# Patient Record
Sex: FEMALE | Race: WHITE | Employment: OTHER | URBAN - METROPOLITAN AREA
[De-identification: names, ages, dates, MRNs, and addresses within clinical notes are randomized per-mention and may not be internally consistent; named-entity substitution may affect disease eponyms.]

---

## 2021-12-01 PROCEDURE — 85025 COMPLETE CBC W/AUTO DIFF WBC: CPT | Performed by: FAMILY MEDICINE

## 2021-12-01 NOTE — PROGRESS NOTES
Sonja Garcia is a 40year old female. CC:  Patient presents with:  Establish Care: per pt  Medication Request: ADHD medication      HPI:  New patient. Has been on Vyvnase for about 6 years for Binge Eating Disorder. It does help.  She has found the - 33.0 pg 23.8 Low        MCHC   32.0 - 36.0 g/dL 31.8 Low        RDW   11.0 - 15.0 % 17.1 High        PLATELET COUNT   983 - 400 Thousand/uL 324       MEAN PLATELET VOLUME   7.5 - 12.5 fL 8.5       NEUTROPHIL #   1,500 - 7,800 cells/uL 9914 High        LY normal.   NECK: No lymphadenopathy, thyromegaly or masses  CAR: S1, S2 normal, RRR; no S3, no S4; no click; murmur negative  PULM: clear to auscultation B, no accessory muscle use  GI: ---  PSYCH: alert and oriented x 3; affect appropriate  SKIN: not exami results to patient/family/caregiver: 0 minutes  Care coordination: 0 minutes     Authorized by Ganesh Hureta M.D.

## 2021-12-10 ENCOUNTER — PATIENT MESSAGE (OUTPATIENT)
Dept: FAMILY MEDICINE CLINIC | Facility: CLINIC | Age: 46
End: 2021-12-10

## 2021-12-10 NOTE — TELEPHONE ENCOUNTER
From: Kraig Carroll  To: Lolita Hernandez MD  Sent: 12/10/2021 2:11 PM CST  Subject: Vyvanse refill    Hi! I was told to leave a message when I need a refill.  I have never used my chart before and the refill option on here says I can’t request a refill thr

## 2022-01-14 ENCOUNTER — PATIENT MESSAGE (OUTPATIENT)
Dept: FAMILY MEDICINE CLINIC | Facility: CLINIC | Age: 47
End: 2022-01-14

## 2022-01-14 NOTE — TELEPHONE ENCOUNTER
From: Isamar Ridley  To: Horacio Lindsey MD  Sent: 1/14/2022 2:40 PM CST  Subject: My ’s appointment     Hi! I am not able to come to [facility] appointment today but I wanted to give you some info.  He has had some pretty significant short term memory

## 2022-02-08 ENCOUNTER — TELEPHONE (OUTPATIENT)
Dept: FAMILY MEDICINE CLINIC | Facility: CLINIC | Age: 47
End: 2022-02-08

## 2022-04-01 ENCOUNTER — OFFICE VISIT (OUTPATIENT)
Dept: FAMILY MEDICINE CLINIC | Facility: CLINIC | Age: 47
End: 2022-04-01

## 2022-04-01 VITALS
HEART RATE: 88 BPM | OXYGEN SATURATION: 99 % | WEIGHT: 213 LBS | TEMPERATURE: 98 F | DIASTOLIC BLOOD PRESSURE: 82 MMHG | BODY MASS INDEX: 30 KG/M2 | SYSTOLIC BLOOD PRESSURE: 122 MMHG

## 2022-04-01 DIAGNOSIS — D18.01 HEMANGIOMA OF SKIN: Primary | ICD-10-CM

## 2022-04-01 PROCEDURE — 99213 OFFICE O/P EST LOW 20 MIN: CPT | Performed by: FAMILY MEDICINE

## 2022-04-01 PROCEDURE — 3079F DIAST BP 80-89 MM HG: CPT | Performed by: FAMILY MEDICINE

## 2022-04-01 PROCEDURE — 3074F SYST BP LT 130 MM HG: CPT | Performed by: FAMILY MEDICINE

## 2022-04-14 ENCOUNTER — PATIENT MESSAGE (OUTPATIENT)
Dept: FAMILY MEDICINE CLINIC | Facility: CLINIC | Age: 47
End: 2022-04-14

## 2022-04-30 ENCOUNTER — TELEPHONE (OUTPATIENT)
Dept: FAMILY MEDICINE CLINIC | Facility: CLINIC | Age: 47
End: 2022-04-30

## 2022-04-30 NOTE — TELEPHONE ENCOUNTER
Please let patient or caregiver know or leave message that:   I received in the mail a form for medical assistance to help cover Trintellix. I have not prescribed this medication for Dian. If another doctor is prescribing this medication for her then a form needs to be sent to the other doctor.   Thanks

## 2022-05-02 NOTE — TELEPHONE ENCOUNTER
Spoke with patient. Medication on the form should have been for Vyvance.  She will request a new form be sent over

## 2022-05-13 ENCOUNTER — TELEPHONE (OUTPATIENT)
Dept: FAMILY MEDICINE CLINIC | Facility: CLINIC | Age: 47
End: 2022-05-13

## 2022-05-13 NOTE — TELEPHONE ENCOUNTER
Spoke with patient. She states that a form from [employer] was to be faxed over for her Vyvanse. Patient advised that a form has been received in the office but it has the wrong medication checked off. Patient states that she will contact them again on Monday to see if the new form has been mailed or can it be faxed? Fax number provided to patient.

## 2022-05-13 NOTE — TELEPHONE ENCOUNTER
REQUEST FOR VYVANSE. PATIENT STATES LAST REQUEST WAS FAXED BACK WITH WRONG MEDICATION. PATIENT STATES THE PHARMACY REFAXED THE REQUEST. NOT THE [facility], BUT ANOTHER PHARMACY (PT NOT SURE OF NAME) THAT HELPS WITH PRICE OF VYVANSE.

## 2022-05-17 ENCOUNTER — TELEPHONE (OUTPATIENT)
Dept: FAMILY MEDICINE CLINIC | Facility: CLINIC | Age: 47
End: 2022-05-17

## 2022-05-17 NOTE — TELEPHONE ENCOUNTER
Patient advised that the forms have been received in the office. Have been completed by Dr. Briseyda Amor. Patient asked that they be mailed and faxed to the company. Fax complete. Copy in mail.

## 2022-05-17 NOTE — TELEPHONE ENCOUNTER
Constantino Enciso from [employer] called.   She states they did not receive form for Vyvanse    Correct fax #  [phone]

## 2022-05-17 NOTE — TELEPHONE ENCOUNTER
Spoke with Kathya Winchester at [employer] Advised that the forms where completed and faxed to [phone]. Kathya Winchester advised that it does take 1- 2 days for the faxes to be delivered to the appropriate destination. They receive about 4,000 faxes per day. Advised to reach back out to the office if they need any more information.

## 2022-05-19 ENCOUNTER — PATIENT MESSAGE (OUTPATIENT)
Dept: FAMILY MEDICINE CLINIC | Facility: CLINIC | Age: 47
End: 2022-05-19

## 2022-05-19 NOTE — TELEPHONE ENCOUNTER
From: Doa Weaver  To: Susan Godinez MD  Sent: 5/19/2022 8:54 AM CDT  Subject: 2 questions     Good morning! The company I use to get my prescriptions through finally got all the paperwork and is processing everything but it takes at least 2 weeks. I only have 4 days left of my medication. Is it possible to get a partial prescription? Out of pocket Vyvanse is about $350-400. I had to pay that last year and I was hoping there might be a way to avoid that entire cost.   Secondly I can not find the name of the hematologist that you referred me to in my records. Is there one that I should schedule with? Thanks!

## 2022-05-24 ENCOUNTER — TELEPHONE (OUTPATIENT)
Dept: FAMILY MEDICINE CLINIC | Facility: CLINIC | Age: 47
End: 2022-05-24

## 2022-05-24 NOTE — TELEPHONE ENCOUNTER
Left message on patients voice and that the office has called the phone number provided and has provided the information that was not able to be verified. Update take 24-48 hours.

## 2022-05-24 NOTE — TELEPHONE ENCOUNTER
Pt called she got a call from prescription  and was advised that our address did not come through clearly on the fax. She was told the fastest way to handle that was to have our office call them with that information.  She said the number is [phone] and don't press any options just wait for a representative

## 2022-05-27 ENCOUNTER — PATIENT MESSAGE (OUTPATIENT)
Dept: FAMILY MEDICINE CLINIC | Facility: CLINIC | Age: 47
End: 2022-05-27

## 2022-05-27 NOTE — TELEPHONE ENCOUNTER
From: Ova Class  To: Lakia Barker MD  Sent: 5/27/2022 3:56 PM CDT  Subject: Mammogram     Hi there! If I schedule a mammogram through my health [employer] it is significantly less expensive. I can have a 2D screening in [address] [place] [address],[address] or a 3D screening in [facility]. Do you have a preference as to which type I should have? And is there anything I can do to be proactive to get the results to you? Thank you.    Dian

## 2022-07-16 ENCOUNTER — PATIENT MESSAGE (OUTPATIENT)
Dept: FAMILY MEDICINE CLINIC | Facility: CLINIC | Age: 47
End: 2022-07-16

## 2022-07-18 ENCOUNTER — PATIENT MESSAGE (OUTPATIENT)
Dept: FAMILY MEDICINE CLINIC | Facility: CLINIC | Age: 47
End: 2022-07-18

## 2022-07-18 NOTE — TELEPHONE ENCOUNTER
From: Cornell Lopez  To: Vipul Abrams MD  Sent: 7/16/2022 1:09 PM CDT  Subject: Blood pressure reading you requested     That bottom number is 71.

## 2022-07-25 NOTE — TELEPHONE ENCOUNTER
From: Ever Love  Sent: 7/24/2022 1:43 PM CDT  To: Kath Love Clinical Staff  Subject: Blood pressure reading you requested     My blood pressure reading was 132/71 this week.

## 2022-10-24 NOTE — TELEPHONE ENCOUNTER
No refill protocol for this medication. Last refill: 9/18/2022 #30 with 0 refills  Last Visit: 6/24/2022   Next Visit: No future appointments. Forward to Dr. Azael Strauss please advise on refills. Thanks.

## 2022-11-11 ENCOUNTER — MED REC SCAN ONLY (OUTPATIENT)
Dept: FAMILY MEDICINE CLINIC | Facility: CLINIC | Age: 47
End: 2022-11-11

## 2022-11-14 ENCOUNTER — PATIENT MESSAGE (OUTPATIENT)
Dept: FAMILY MEDICINE CLINIC | Facility: CLINIC | Age: 47
End: 2022-11-14

## 2022-12-08 ENCOUNTER — MED REC SCAN ONLY (OUTPATIENT)
Dept: FAMILY MEDICINE CLINIC | Facility: CLINIC | Age: 47
End: 2022-12-08

## 2023-01-20 ENCOUNTER — OFFICE VISIT (OUTPATIENT)
Dept: FAMILY MEDICINE CLINIC | Facility: CLINIC | Age: 48
End: 2023-01-20
Payer: COMMERCIAL

## 2023-01-20 VITALS
OXYGEN SATURATION: 99 % | HEIGHT: 71 IN | TEMPERATURE: 99 F | SYSTOLIC BLOOD PRESSURE: 124 MMHG | HEART RATE: 100 BPM | BODY MASS INDEX: 29.63 KG/M2 | DIASTOLIC BLOOD PRESSURE: 84 MMHG | RESPIRATION RATE: 16 BRPM | WEIGHT: 211.63 LBS

## 2023-01-20 DIAGNOSIS — M67.449 DIGITAL MUCINOUS CYST: ICD-10-CM

## 2023-01-20 DIAGNOSIS — F50.81 BINGE EATING DISORDER: ICD-10-CM

## 2023-01-20 DIAGNOSIS — Z00.00 WELL ADULT EXAM: Primary | ICD-10-CM

## 2023-01-20 PROCEDURE — 3008F BODY MASS INDEX DOCD: CPT | Performed by: FAMILY MEDICINE

## 2023-01-20 PROCEDURE — 3079F DIAST BP 80-89 MM HG: CPT | Performed by: FAMILY MEDICINE

## 2023-01-20 PROCEDURE — 3074F SYST BP LT 130 MM HG: CPT | Performed by: FAMILY MEDICINE

## 2023-01-20 PROCEDURE — 99396 PREV VISIT EST AGE 40-64: CPT | Performed by: FAMILY MEDICINE

## 2023-01-23 ENCOUNTER — NURSE ONLY (OUTPATIENT)
Dept: FAMILY MEDICINE CLINIC | Facility: CLINIC | Age: 48
End: 2023-01-23
Payer: COMMERCIAL

## 2023-01-23 DIAGNOSIS — Z00.00 WELL ADULT EXAM: ICD-10-CM

## 2023-01-23 LAB
ALT SERPL-CCNC: 21 U/L
ANION GAP SERPL CALC-SCNC: 2 MMOL/L (ref 0–18)
AST SERPL-CCNC: 15 U/L (ref 15–37)
BUN BLD-MCNC: 14 MG/DL (ref 7–18)
CALCIUM BLD-MCNC: 9 MG/DL (ref 8.5–10.1)
CHLORIDE SERPL-SCNC: 109 MMOL/L (ref 98–112)
CHOLEST SERPL-MCNC: 189 MG/DL (ref ?–200)
CO2 SERPL-SCNC: 25 MMOL/L (ref 21–32)
CREAT BLD-MCNC: 0.72 MG/DL
ERYTHROCYTE [DISTWIDTH] IN BLOOD BY AUTOMATED COUNT: 12.6 %
FASTING PATIENT LIPID ANSWER: YES
FASTING STATUS PATIENT QL REPORTED: YES
GFR SERPLBLD BASED ON 1.73 SQ M-ARVRAT: 104 ML/MIN/1.73M2 (ref 60–?)
GLUCOSE BLD-MCNC: 99 MG/DL (ref 70–99)
HCT VFR BLD AUTO: 41.7 %
HDLC SERPL-MCNC: 52 MG/DL (ref 40–59)
HGB BLD-MCNC: 13.5 G/DL
LDLC SERPL CALC-MCNC: 124 MG/DL (ref ?–100)
MCH RBC QN AUTO: 29.8 PG (ref 26–34)
MCHC RBC AUTO-ENTMCNC: 32.4 G/DL (ref 31–37)
MCV RBC AUTO: 92.1 FL
NONHDLC SERPL-MCNC: 137 MG/DL (ref ?–130)
OSMOLALITY SERPL CALC.SUM OF ELEC: 283 MOSM/KG (ref 275–295)
PLATELET # BLD AUTO: 263 10(3)UL (ref 150–450)
POTASSIUM SERPL-SCNC: 4.1 MMOL/L (ref 3.5–5.1)
RBC # BLD AUTO: 4.53 X10(6)UL
SODIUM SERPL-SCNC: 136 MMOL/L (ref 136–145)
TRIGL SERPL-MCNC: 68 MG/DL (ref 30–149)
TSI SER-ACNC: 1.46 MIU/ML (ref 0.36–3.74)
VLDLC SERPL CALC-MCNC: 12 MG/DL (ref 0–30)
WBC # BLD AUTO: 9.4 X10(3) UL (ref 4–11)

## 2023-01-23 PROCEDURE — 84460 ALANINE AMINO (ALT) (SGPT): CPT | Performed by: FAMILY MEDICINE

## 2023-01-23 PROCEDURE — 84450 TRANSFERASE (AST) (SGOT): CPT | Performed by: FAMILY MEDICINE

## 2023-01-23 PROCEDURE — 80048 BASIC METABOLIC PNL TOTAL CA: CPT | Performed by: FAMILY MEDICINE

## 2023-01-23 PROCEDURE — 84443 ASSAY THYROID STIM HORMONE: CPT | Performed by: FAMILY MEDICINE

## 2023-01-23 PROCEDURE — 85027 COMPLETE CBC AUTOMATED: CPT | Performed by: FAMILY MEDICINE

## 2023-01-23 PROCEDURE — 80061 LIPID PANEL: CPT | Performed by: FAMILY MEDICINE

## 2023-01-23 NOTE — PROGRESS NOTES
Babita Ivan present in office for nurse visit. Labs as ordered by Dr. Ely Ceja; 22 g, Right AC, lavender tube and green tube     All questions/concerns addressed. Patient left in stable condition.

## 2023-02-01 ENCOUNTER — MED REC SCAN ONLY (OUTPATIENT)
Dept: FAMILY MEDICINE CLINIC | Facility: CLINIC | Age: 48
End: 2023-02-01

## 2023-02-13 ENCOUNTER — OFFICE VISIT (OUTPATIENT)
Dept: ORTHOPEDICS CLINIC | Facility: CLINIC | Age: 48
End: 2023-02-13
Payer: COMMERCIAL

## 2023-02-13 ENCOUNTER — HOSPITAL ENCOUNTER (OUTPATIENT)
Dept: GENERAL RADIOLOGY | Age: 48
Discharge: HOME | End: 2023-02-13
Attending: PHYSICIAN ASSISTANT
Payer: COMMERCIAL

## 2023-02-13 VITALS — HEIGHT: 71 IN | WEIGHT: 211 LBS | BODY MASS INDEX: 29.54 KG/M2

## 2023-02-13 DIAGNOSIS — M79.645 FINGER PAIN, LEFT: ICD-10-CM

## 2023-02-13 DIAGNOSIS — M67.449 MUCOUS CYST OF FINGER: Primary | ICD-10-CM

## 2023-02-13 PROCEDURE — 73140 X-RAY EXAM OF FINGER(S): CPT | Performed by: PHYSICIAN ASSISTANT

## 2023-02-13 PROCEDURE — 3008F BODY MASS INDEX DOCD: CPT | Performed by: PHYSICIAN ASSISTANT

## 2023-02-13 PROCEDURE — 99203 OFFICE O/P NEW LOW 30 MIN: CPT | Performed by: PHYSICIAN ASSISTANT

## 2023-02-16 ENCOUNTER — TELEPHONE (OUTPATIENT)
Dept: ORTHOPEDICS CLINIC | Facility: CLINIC | Age: 48
End: 2023-02-16

## 2023-02-23 ENCOUNTER — TELEPHONE (OUTPATIENT)
Dept: ORTHOPEDICS CLINIC | Facility: CLINIC | Age: 48
End: 2023-02-23

## 2023-02-23 ENCOUNTER — PATIENT MESSAGE (OUTPATIENT)
Dept: FAMILY MEDICINE CLINIC | Facility: CLINIC | Age: 48
End: 2023-02-23

## 2023-02-23 DIAGNOSIS — M67.449 MUCOUS CYST OF FINGER: Primary | ICD-10-CM

## 2023-04-10 ENCOUNTER — OFFICE VISIT (OUTPATIENT)
Dept: ORTHOPEDICS CLINIC | Facility: CLINIC | Age: 48
End: 2023-04-10
Payer: COMMERCIAL

## 2023-04-10 VITALS — BODY MASS INDEX: 28.7 KG/M2 | WEIGHT: 205 LBS | HEIGHT: 71 IN

## 2023-04-10 DIAGNOSIS — M67.449 MUCOUS CYST OF FINGER: Primary | ICD-10-CM

## 2023-04-20 ENCOUNTER — MED REC SCAN ONLY (OUTPATIENT)
Dept: FAMILY MEDICINE CLINIC | Facility: CLINIC | Age: 48
End: 2023-04-20

## 2023-04-21 ENCOUNTER — APPOINTMENT (OUTPATIENT)
Dept: GENERAL RADIOLOGY | Facility: HOSPITAL | Age: 48
End: 2023-04-21
Attending: ORTHOPAEDIC SURGERY
Payer: COMMERCIAL

## 2023-04-21 ENCOUNTER — HOSPITAL ENCOUNTER (OUTPATIENT)
Facility: HOSPITAL | Age: 48
Setting detail: HOSPITAL OUTPATIENT SURGERY
Discharge: HOME | End: 2023-04-21
Attending: ORTHOPAEDIC SURGERY | Admitting: ORTHOPAEDIC SURGERY
Payer: COMMERCIAL

## 2023-04-21 VITALS
RESPIRATION RATE: 17 BRPM | TEMPERATURE: 97 F | SYSTOLIC BLOOD PRESSURE: 157 MMHG | HEART RATE: 76 BPM | WEIGHT: 214 LBS | OXYGEN SATURATION: 97 % | HEIGHT: 71 IN | BODY MASS INDEX: 29.96 KG/M2 | DIASTOLIC BLOOD PRESSURE: 91 MMHG

## 2023-04-21 DIAGNOSIS — M67.449 MUCOUS CYST OF FINGER: ICD-10-CM

## 2023-04-21 LAB — B-HCG UR QL: NEGATIVE

## 2023-04-21 PROCEDURE — 76000 FLUOROSCOPY <1 HR PHYS/QHP: CPT | Performed by: ORTHOPAEDIC SURGERY

## 2023-04-21 PROCEDURE — 0LB80ZZ EXCISION OF LEFT HAND TENDON, OPEN APPROACH: ICD-10-PCS | Performed by: ORTHOPAEDIC SURGERY

## 2023-04-21 PROCEDURE — 81025 URINE PREGNANCY TEST: CPT

## 2023-04-21 PROCEDURE — 88304 TISSUE EXAM BY PATHOLOGIST: CPT | Performed by: ORTHOPAEDIC SURGERY

## 2023-04-21 RX ORDER — HYDROCODONE BITARTRATE AND ACETAMINOPHEN 5; 325 MG/1; MG/1
1 TABLET ORAL EVERY 6 HOURS PRN
Qty: 5 TABLET | Refills: 0 | Status: SHIPPED | OUTPATIENT
Start: 2023-04-21

## 2023-04-21 RX ORDER — SODIUM CHLORIDE, SODIUM LACTATE, POTASSIUM CHLORIDE, CALCIUM CHLORIDE 600; 310; 30; 20 MG/100ML; MG/100ML; MG/100ML; MG/100ML
INJECTION, SOLUTION INTRAVENOUS CONTINUOUS
Status: DISCONTINUED | OUTPATIENT
Start: 2023-04-21 | End: 2023-04-21

## 2023-04-21 RX ORDER — CEFAZOLIN SODIUM/WATER 2 G/20 ML
SYRINGE (ML) INTRAVENOUS
Status: DISCONTINUED
Start: 2023-04-21 | End: 2023-04-21

## 2023-04-21 RX ORDER — BUPIVACAINE HYDROCHLORIDE 5 MG/ML
INJECTION, SOLUTION EPIDURAL; INTRACAUDAL AS NEEDED
Status: DISCONTINUED | OUTPATIENT
Start: 2023-04-21 | End: 2023-04-21 | Stop reason: HOSPADM

## 2023-04-21 RX ORDER — LIDOCAINE HYDROCHLORIDE 10 MG/ML
INJECTION, SOLUTION INFILTRATION; PERINEURAL AS NEEDED
Status: DISCONTINUED | OUTPATIENT
Start: 2023-04-21 | End: 2023-04-21 | Stop reason: HOSPADM

## 2023-04-21 RX ORDER — CEFAZOLIN SODIUM/WATER 2 G/20 ML
2 SYRINGE (ML) INTRAVENOUS ONCE
Status: COMPLETED | OUTPATIENT
Start: 2023-04-21 | End: 2023-04-21

## 2023-04-21 NOTE — OPERATIVE REPORT
Operative Note    Patient Name: Hung Lyn    Preoperative Diagnosis:     1. Mucous cyst of left middle finger [M67.449]  2. Bone spur of left middle finger DIP joint    Postoperative Diagnosis:     1. Mucous cyst of left middle finger [M67.449]  2. Bone spur left middle finger DIP joint    Surgeon(s) and Role:     Isabell Henderson MD - Primary     Assistant: PA: J CARLOS Burton     A PA was needed for the successful completion of this case. She was essential for the proper positioning of patient, manipulation of instruments, proper exposure, manipulation of soft tissue, and wound closure. Procedures:     1. Excision of left middle finger mucous cyst  2. Excision of left middle finger DIP joint bone spur    Antibiotics: Ancef 2 g    Surgical Findings: Mucous cyst with underlying bone spur    Anesthesia: Local    Complications: None    Implants: * No implants in log *    Specimen: Mucous cyst    Condition: Stable    Estimated Blood Loss: * No blood loss amount entered *     Indications:  56year old female with a symptomatic mucous cyst that was affecting the patient's job and was noted to be intermittently draining mucinous fluid. Having failed nonsurgical management patient wished to have this mucous cyst and bone spur surgically excised. The risks associated with the procedure, expected  outcome, the expected time to recovery, and the rehab required were reviewed. All of the patient's questions were answered. Procedure:  Patient was met in the preoperative holding area where consent was verified, laterality was marked with the surgeon's initials, and the H&P was updated. Patient was brought to the operating room on a transport cart. Patient was transferred from the transport cart onto the operating room table and placed in a supine position. An upper arm tourniquet was placed. The arm was prepped and draped in the usual sterile fashion. A digital block was performed.   A surgical timeout was performed. Antibiotics were fully infused. A finger tourniquet was applied. 15 blade was used to make a longitudinal incision adjacent to the mucous cyst incision was carried through dermis. The skin was elevated off the mucous cyst.  The mucous cyst was excised in its entirety off of the capsule. Capsulotomy was then performed and the bone spur over the distal aspect of the middle phalanx head was noted. A rongeur was used to excise the bone spur with smooth base. Fluoroscopy was used to confirm excision of bone spur. The wound was irrigated with sterile saline prior to closure    Closure:  4-0 nylon was used to reapproximate the skin edges. Dressing/Splint:  Bacitracin, Adaptic, gauze and 1 inch Coban. Post Operative:  Patient was taken to PACU for further recovery and discharge home. Jiaro Smalls MD  Hand, Wrist, & Elbow Surgery  [facility]  [facility] [address], [address]  [email or web address]  t: [id number]  f: [phone]

## 2023-04-21 NOTE — DISCHARGE INSTRUCTIONS
Dressing: Keep the dressing on until postop day 5. Keep dressing covered and dry while showering. No baths or submerging incision in water. Keep incision covered with band aid until follow up. Weight Bearing: No lifting greater than 1 pounds. Activity: Resume your normal activities of daily living within the 1 pound lifting restriction. Pain: Ice and elevate extremity to minimize swelling. Take acetaminophen and ibuprofen for pain. Follow-up: Call for follow-up appointment if you do not have one.

## 2023-05-01 ENCOUNTER — MED REC SCAN ONLY (OUTPATIENT)
Dept: FAMILY MEDICINE CLINIC | Facility: CLINIC | Age: 48
End: 2023-05-01

## 2023-05-08 ENCOUNTER — OFFICE VISIT (OUTPATIENT)
Dept: ORTHOPEDICS CLINIC | Facility: CLINIC | Age: 48
End: 2023-05-08
Payer: COMMERCIAL

## 2023-05-08 VITALS — HEIGHT: 71 IN | WEIGHT: 214 LBS | BODY MASS INDEX: 29.96 KG/M2

## 2023-05-08 DIAGNOSIS — M67.449 MUCOUS CYST OF FINGER: Primary | ICD-10-CM

## 2023-05-08 PROCEDURE — 99024 POSTOP FOLLOW-UP VISIT: CPT | Performed by: PHYSICIAN ASSISTANT

## 2023-05-08 PROCEDURE — 3008F BODY MASS INDEX DOCD: CPT | Performed by: PHYSICIAN ASSISTANT

## 2023-06-23 ENCOUNTER — PATIENT MESSAGE (OUTPATIENT)
Dept: ORTHOPEDICS CLINIC | Facility: CLINIC | Age: 48
End: 2023-06-23

## 2023-06-27 ENCOUNTER — PATIENT MESSAGE (OUTPATIENT)
Dept: FAMILY MEDICINE CLINIC | Facility: CLINIC | Age: 48
End: 2023-06-27

## 2023-07-10 ENCOUNTER — OFFICE VISIT (OUTPATIENT)
Dept: ORTHOPEDICS CLINIC | Facility: CLINIC | Age: 48
End: 2023-07-10
Payer: COMMERCIAL

## 2023-07-10 ENCOUNTER — HOSPITAL ENCOUNTER (OUTPATIENT)
Dept: GENERAL RADIOLOGY | Age: 48
Discharge: HOME | End: 2023-07-10
Attending: ORTHOPAEDIC SURGERY
Payer: COMMERCIAL

## 2023-07-10 VITALS — HEIGHT: 71 IN | WEIGHT: 214 LBS | BODY MASS INDEX: 29.96 KG/M2

## 2023-07-10 DIAGNOSIS — M67.449 MUCOUS CYST OF FINGER: ICD-10-CM

## 2023-07-10 DIAGNOSIS — M67.449 MUCOUS CYST OF FINGER: Primary | ICD-10-CM

## 2023-07-10 PROCEDURE — 3008F BODY MASS INDEX DOCD: CPT | Performed by: ORTHOPAEDIC SURGERY

## 2023-07-10 PROCEDURE — 73140 X-RAY EXAM OF FINGER(S): CPT | Performed by: ORTHOPAEDIC SURGERY

## 2023-07-10 PROCEDURE — 99214 OFFICE O/P EST MOD 30 MIN: CPT | Performed by: ORTHOPAEDIC SURGERY

## 2023-07-18 ENCOUNTER — TELEPHONE (OUTPATIENT)
Dept: ORTHOPEDICS CLINIC | Facility: CLINIC | Age: 48
End: 2023-07-18

## 2023-07-18 DIAGNOSIS — M67.449 MUCOUS CYST OF FINGER: Primary | ICD-10-CM

## 2023-07-18 NOTE — TELEPHONE ENCOUNTER
Date of Surgery: 10/27/23     Post Op Appt:  23  @ 820AM     Case ID: [id number]     Notes:           SURGICAL BOOKING SHEET   Name: Carissa Andrade  MRN: [id number]   : 1975     Surgical Date:    10/27/23   Surgical Consent:    Excision of left middle finger mucous cyst and bone spur   Diagnosis:     (M67.449) Mucous cyst of finger  (primary encounter diagnosis)    Procedure Codes:    Excision of DIP joint osteophyte (37153) or tendon sheath cyst (68580)   Disposition:    Outpatient   Operative Time:    15 mins   Antibiotics:    Ancef 2g   Anesthesia Type:    Local, PIV Insertion, LR 20mL/hr   Clearance:     NONE   Equipment:    Mucous Cyst: [place] Blade, Mini-C-arm, Local Off Field (0.5% marcaine + 1% lidocaine w/o Epi 1:1 mix), Size 6 Glove, 4-0 nylon, Bacitracin, Adaptic, 1 inch beau, 1 inch Coban   Positioning:    Supine with arm table on transport cart   Assistant:    Assistant: Roxy Guardado PA-C   Follow Up:    12-14 days post op with Vinita Berg   Pain Medication:    Norco   Therapy:    None

## 2023-08-28 ENCOUNTER — PATIENT MESSAGE (OUTPATIENT)
Dept: FAMILY MEDICINE CLINIC | Facility: CLINIC | Age: 48
End: 2023-08-28

## 2023-09-07 ENCOUNTER — MED REC SCAN ONLY (OUTPATIENT)
Dept: FAMILY MEDICINE CLINIC | Facility: CLINIC | Age: 48
End: 2023-09-07

## 2023-10-05 ENCOUNTER — PATIENT MESSAGE (OUTPATIENT)
Dept: FAMILY MEDICINE CLINIC | Facility: CLINIC | Age: 48
End: 2023-10-05

## 2023-10-06 RX ORDER — LISDEXAMFETAMINE DIMESYLATE CAPSULES 60 MG/1
60 CAPSULE ORAL EVERY MORNING
Qty: 30 CAPSULE | Refills: 0 | Status: SHIPPED | OUTPATIENT
Start: 2023-10-06

## 2023-10-06 NOTE — TELEPHONE ENCOUNTER
From: Kassandra Fink  To: See Houser  Sent: 10/5/2023 9:57 PM CDT  Subject: Vyvanse refill    Hi! Vyvanse is now available in generic form so it should be easier to find. Jose M says I need a new prescription. Can we stick with the 60mg. I feel like I tolerate that better than the 70mg. I feel less anxious. Thank you!    Kenneth

## 2023-10-13 ENCOUNTER — TELEPHONE (OUTPATIENT)
Dept: ORTHOPEDICS CLINIC | Facility: CLINIC | Age: 48
End: 2023-10-13

## 2023-10-13 NOTE — TELEPHONE ENCOUNTER
Called and spoke with Kenneth in regards to her upcoming surgery. She states that she would like to cancel her upcoming surgery as her cyst has grown back but is not bothersome. She states that she is going through some life changes and at this time she is not financially, or emotionally ready to proceed with surgery. She states that she will give the office a call back when she is ready to proceed with surgery.        SURGERY HAS BEEN CANCELED

## 2023-11-08 RX ORDER — LISDEXAMFETAMINE DIMESYLATE CAPSULES 60 MG/1
60 CAPSULE ORAL EVERY MORNING
Qty: 30 CAPSULE | Refills: 0 | Status: SHIPPED | OUTPATIENT
Start: 2023-11-08

## 2023-12-08 RX ORDER — LISDEXAMFETAMINE DIMESYLATE CAPSULES 60 MG/1
60 CAPSULE ORAL EVERY MORNING
Qty: 30 CAPSULE | Refills: 0 | Status: SHIPPED | OUTPATIENT
Start: 2023-12-08

## 2024-01-12 NOTE — TELEPHONE ENCOUNTER
Please let patient or caregiver know or leave message that refill request for the medication/s listed below has/have come to our office. The refills have been sent.  Her insurance is recommending/requiring colon cancer screening. If not done then our office will be penalized. There is a push to have these done by health entities now. We can do FIT, Cologuard or Colonoscopy. If she prefers Colonoscopy then see [facility] Tel: [phone]. It would be appreciated if something could be done in the next 6 months so our office avoid penalty from her insurance.   Thanks       Requested Prescriptions     Signed Prescriptions Disp Refills    Lisdexamfetamine Dimesylate (VYVANSE) 60 MG Oral Cap 30 capsule 0     Sig: Take 1 capsule (60 mg total) by mouth every morning.     Authorizing Provider: LIZETTE FOWLER

## 2024-01-16 ENCOUNTER — TELEPHONE (OUTPATIENT)
Dept: FAMILY MEDICINE CLINIC | Facility: CLINIC | Age: 49
End: 2024-01-16

## 2024-01-17 ENCOUNTER — PATIENT MESSAGE (OUTPATIENT)
Dept: FAMILY MEDICINE CLINIC | Facility: CLINIC | Age: 49
End: 2024-01-17

## 2024-01-17 NOTE — TELEPHONE ENCOUNTER
From: Babita Joy  To: Prince Schaffer  Sent: 1/17/2024 3:14 PM CST  Subject: Insurance issue with prescription     Jose M says there is an insurance issue and they faxed you a request for it. I was just following up because they still are unable to fill it and I don’t have a ton of confidence in what they tell me.   Thanks!   Have a great day!  Kenneth

## 2024-01-19 NOTE — TELEPHONE ENCOUNTER
Outcome  Approved on January 17  The case has been Approved from [id number] to [id number]  Authorization Expiration Date: 1/16/2025

## 2024-01-24 ENCOUNTER — TELEPHONE (OUTPATIENT)
Dept: FAMILY MEDICINE CLINIC | Facility: CLINIC | Age: 49
End: 2024-01-24

## 2024-01-24 ENCOUNTER — HOSPITAL ENCOUNTER (OUTPATIENT)
Dept: MAMMOGRAPHY | Age: 49
Discharge: HOME | End: 2024-01-24
Attending: FAMILY MEDICINE
Payer: COMMERCIAL

## 2024-01-24 DIAGNOSIS — Z12.31 BREAST CANCER SCREENING BY MAMMOGRAM: ICD-10-CM

## 2024-01-24 PROCEDURE — 77063 BREAST TOMOSYNTHESIS BI: CPT | Performed by: FAMILY MEDICINE

## 2024-01-24 PROCEDURE — 77067 SCR MAMMO BI INCL CAD: CPT | Performed by: FAMILY MEDICINE

## 2024-01-24 NOTE — TELEPHONE ENCOUNTER
Left detailed message on pt's voicemail to call back or send [employer] on if she would like to  FIT test or have us mail on to her.

## 2024-02-06 ENCOUNTER — PATIENT MESSAGE (OUTPATIENT)
Dept: FAMILY MEDICINE CLINIC | Facility: CLINIC | Age: 49
End: 2024-02-06

## 2024-02-06 DIAGNOSIS — R92.8 ABNORMAL MAMMOGRAM OF BOTH BREASTS: Primary | ICD-10-CM

## 2024-02-06 NOTE — TELEPHONE ENCOUNTER
From: Babita Joy  To: Prince Schaffer  Sent: 2/6/2024 3:35 PM CST  Subject: Mammogram     Hi! I was contacted by someone saying I needed additional imaging. Maybe an ultrasound. She said radiology would contact me but I should call if I had not heard from them. So I called to schedule but central scheduling said they didn’t see an order for any additional imaging. I was just following up to see what my next step should be.   Thanks!   Kenneth

## 2024-02-15 ENCOUNTER — PATIENT MESSAGE (OUTPATIENT)
Dept: FAMILY MEDICINE CLINIC | Facility: CLINIC | Age: 49
End: 2024-02-15

## 2024-02-19 ENCOUNTER — NURSE ONLY (OUTPATIENT)
Dept: FAMILY MEDICINE CLINIC | Facility: CLINIC | Age: 49
End: 2024-02-19
Payer: COMMERCIAL

## 2024-02-19 DIAGNOSIS — Z12.11 COLON CANCER SCREENING: ICD-10-CM

## 2024-02-19 LAB — HEMOCCULT STL QL: NEGATIVE

## 2024-02-19 PROCEDURE — 82274 ASSAY TEST FOR BLOOD FECAL: CPT | Performed by: FAMILY MEDICINE

## 2024-02-20 DIAGNOSIS — F50.81 BINGE EATING DISORDER: ICD-10-CM

## 2024-02-20 RX ORDER — LISDEXAMFETAMINE DIMESYLATE CAPSULES 60 MG/1
60 CAPSULE ORAL EVERY MORNING
Qty: 30 CAPSULE | Refills: 0 | Status: SHIPPED | OUTPATIENT
Start: 2024-02-20

## 2024-02-20 NOTE — TELEPHONE ENCOUNTER
LOV: 12/23/23   Last Refill: 01/12/24     Future Appointments   Date Time Provider Department Center   2/26/2024  2:20 PM PF [facility]andes

## 2024-02-26 ENCOUNTER — HOSPITAL ENCOUNTER (OUTPATIENT)
Dept: MAMMOGRAPHY | Age: 49
Discharge: HOME | End: 2024-02-26
Attending: FAMILY MEDICINE
Payer: COMMERCIAL

## 2024-02-26 ENCOUNTER — HOSPITAL ENCOUNTER (OUTPATIENT)
Dept: ULTRASOUND IMAGING | Age: 49
Discharge: HOME | End: 2024-02-26
Attending: FAMILY MEDICINE
Payer: COMMERCIAL

## 2024-02-26 DIAGNOSIS — R92.8 ABNORMAL MAMMOGRAM OF BOTH BREASTS: ICD-10-CM

## 2024-02-26 PROCEDURE — 77066 DX MAMMO INCL CAD BI: CPT | Performed by: FAMILY MEDICINE

## 2024-02-26 PROCEDURE — 76642 ULTRASOUND BREAST LIMITED: CPT | Performed by: FAMILY MEDICINE

## 2024-02-26 PROCEDURE — 77062 BREAST TOMOSYNTHESIS BI: CPT | Performed by: FAMILY MEDICINE

## 2024-02-29 NOTE — TELEPHONE ENCOUNTER
----- Message from Shawanda Grimaldo RN sent at 2024 10:57 AM CST -----  Patient's name and  verified   Patient was wondering if the calcium deposits could be due to breast feeding.  Patient doesn't take any calcium supplement  Patient notified and verbalized an understanding

## 2024-03-19 DIAGNOSIS — F50.81 BINGE EATING DISORDER: ICD-10-CM

## 2024-03-20 RX ORDER — LISDEXAMFETAMINE DIMESYLATE CAPSULES 60 MG/1
60 CAPSULE ORAL EVERY MORNING
Qty: 30 CAPSULE | Refills: 0 | Status: SHIPPED | OUTPATIENT
Start: 2024-03-20

## 2024-04-18 DIAGNOSIS — F50.81 BINGE EATING DISORDER: ICD-10-CM

## 2024-04-19 RX ORDER — LISDEXAMFETAMINE DIMESYLATE CAPSULES 60 MG/1
60 CAPSULE ORAL EVERY MORNING
Qty: 30 CAPSULE | Refills: 0 | Status: SHIPPED | OUTPATIENT
Start: 2024-04-19

## 2024-04-23 ENCOUNTER — PATIENT MESSAGE (OUTPATIENT)
Dept: FAMILY MEDICINE CLINIC | Facility: CLINIC | Age: 49
End: 2024-04-23

## 2024-04-23 NOTE — TELEPHONE ENCOUNTER
From: Babita Joy  To: Prince Schaffer  Sent: 4/23/2024 12:47 PM CDT  Subject: Insurance issue with prescription     Jose M contacted me to tell me there is an insurance issue with my prescription. They don’t have nikki information and said they are waiting to hear back from my insurance. Do you know what is going on with it?   Thanks!   Kenneth

## 2024-05-28 DIAGNOSIS — F50.81 BINGE EATING DISORDER: ICD-10-CM

## 2024-05-28 RX ORDER — LISDEXAMFETAMINE DIMESYLATE 60 MG/1
60 CAPSULE ORAL EVERY MORNING
Qty: 30 CAPSULE | Refills: 0 | Status: SHIPPED | OUTPATIENT
Start: 2024-05-28

## 2024-06-14 ENCOUNTER — OFFICE VISIT (OUTPATIENT)
Dept: FAMILY MEDICINE CLINIC | Facility: CLINIC | Age: 49
End: 2024-06-14
Payer: COMMERCIAL

## 2024-06-14 VITALS
TEMPERATURE: 98 F | OXYGEN SATURATION: 99 % | SYSTOLIC BLOOD PRESSURE: 134 MMHG | WEIGHT: 209.81 LBS | HEART RATE: 84 BPM | HEIGHT: 71 IN | BODY MASS INDEX: 29.37 KG/M2 | DIASTOLIC BLOOD PRESSURE: 82 MMHG

## 2024-06-14 DIAGNOSIS — R51.9 ACUTE NONINTRACTABLE HEADACHE, UNSPECIFIED HEADACHE TYPE: ICD-10-CM

## 2024-06-14 DIAGNOSIS — Z00.00 WELL ADULT EXAM: Primary | ICD-10-CM

## 2024-06-14 DIAGNOSIS — F50.81 BINGE EATING DISORDER: ICD-10-CM

## 2024-06-14 PROBLEM — F50.819 BINGE EATING DISORDER: Status: ACTIVE | Noted: 2024-06-14

## 2024-06-14 PROCEDURE — 99396 PREV VISIT EST AGE 40-64: CPT | Performed by: FAMILY MEDICINE

## 2024-06-14 PROCEDURE — 3079F DIAST BP 80-89 MM HG: CPT | Performed by: FAMILY MEDICINE

## 2024-06-14 PROCEDURE — 3008F BODY MASS INDEX DOCD: CPT | Performed by: FAMILY MEDICINE

## 2024-06-14 PROCEDURE — 3075F SYST BP GE 130 - 139MM HG: CPT | Performed by: FAMILY MEDICINE

## 2024-06-14 NOTE — PROGRESS NOTES
Babita Joy is a 48 year old female.    CC:    Chief Complaint   Patient presents with    Physical       HPI:  Patient is here for yearly, wellness exam  Last Lipid:  Lab Results   Component Value Date    CHOLEST 189 2023    TRIG 68 2023    HDL 52 2023     (H) 2023    VLDL 12 2023    NONHDLC 137 (H) 2023       Last colon cancer screen:  FIT     Last mammo: 2024, will need f/u diagnostic mammo in 2024 per Radiology recommendation    Last Pap: 2022        There is no immunization history on file for this patient.       Patient Active Problem List   Diagnosis    Binge eating disorder: has been on Vyvance for many years. It works well to control symptoms.        F/u ER visit last month to [facility] for sever headache that awoke her. CT brain at ER unremarkable. No labs done. She did have emesis with the headache. She does not typically get headaches.   CT Brain without IV Contrast    Anatomical Region Laterality Modality   Head -- Computed Tomography     Impression    Impression:    1.  No obvious acute intracranial hemorrhage or depressed skull fracture. No large territory acute infarct.    Electronically Signed By: Sarah Banks on 2024 10:52 AM CDT Reading Location: [id number]    Allergies:  Allergies   Allergen Reactions    Penicillins RASH    Povidone Iodine RASH      Current Meds:  Current Outpatient Medications   Medication Sig Dispense Refill    Lisdexamfetamine Dimesylate (VYVANSE) 60 MG Oral Cap Take 1 capsule (60 mg total) by mouth every morning. 30 capsule 0        History:  Past Medical History:    Anemia    Due to heavy/irregular menses    Attention deficit hyperactivity disorder (ADHD)    Binge eating disorder    Hx of motion sickness    PONV (postoperative nausea and vomiting)      Past Surgical History:   Procedure Laterality Date    Anesth, section      x 2      Family History   Problem Relation Age of Onset     Hypertension Mother     Diabetes Father     Bipolar Disorder Father     Heart Disease Father     Breast Cancer Maternal Aunt 52      Family Status   Relation Status    Mo Alive    Fa     Mat Aunt (Not Specified)      Social History     Socioeconomic History    Marital status:    Tobacco Use    Smoking status: Never    Smokeless tobacco: Never   Vaping Use    Vaping status: Never Used   Substance and Sexual Activity    Alcohol use: Yes     Comment: social wine intake    Drug use: Yes     Types: Cannabis     Comment: occasionally     Social Determinants of Health     Food Insecurity: No Food Insecurity (2024)    Received from [facility]    Food Insecurity     Currently or in the past 3 months, have you worried your food would run out before you had money to buy more?: No   Transportation Needs: No Transportation Needs (2024)    Received from [facility]    Transportation Needs     Medical Transportation Needs?: No    Received from [facility], [facility]    Social Connections    Received from [facility], [facility]    Housing Stability        ROS:  General: energy level stable  Cardiovascular/Pulses: Denies palpitations, exertional chest pain or pressure    Vitals: /82   Pulse 84   Temp 97.6 °F (36.4 °C) (Temporal)   Ht 5' 11\" (1.803 m)   Wt 209 lb 12.8 oz (95.2 kg)   LMP 2024 (Exact Date)   SpO2 99%   BMI 29.26 kg/m²    Reviewed by RICK Schaffer M.D.    Physical Exam:  GEN: well developed, well nourished, in no apparent distress  EYE: B conjunctiva and lids normal  HENT: normocephalic; normal nose, pharynx and TM's  NECK: No lymphadenopathy, thyromegaly or masses  CAR: S1, S2 normal, RRR; no S3, no S4; no click; murmur negative  PULM: clear to auscultation B, no accessory muscle use  GI: normal active BS+, soft, nondistended; no HSM; no masses; no bruits; no  masses; nontender, no G/R/R   PSYCH: alert and oriented x 3; affect appropriate  SKIN: not examined  EXTREMITIES: No clubbing, cyanosis or edema  GENITAL: not examined  LYMPH: no supraclavicular nodes  NEURO: Awake and alert. Normal speech and articulation. No facial droop or asymmetry. Moving all extremities equally. Symmetric B patellar DTRs, CN 2-12 grossly intact. No focal deficits. Finger to nose and heel to shin intact. No pronator drift. No dysdiadochokinesis.      ASSESSMENT AND PLAN    1. Well adult exam  Td recommended. She will contemplate. If Td not done and she sustains a tetanus prone wound then she is to go immediately to the nearest ER    - ALT(SGPT); Future  - AST (SGOT); Future  - Basic Metabolic Panel (8); Future  - CBC, Platelet; No Differential; Future  - Lipid Panel; Future  - TSH W Reflex To Free T4; Future    2. Binge eating disorder  Stable   Continue Vyvnase    3. Acute nonintractable headache, unspecified headache type  To ensure no other evaluation is needed.  - Neuro Referral - In Network      No follow-ups on file.    Orders for this visit:    Orders Placed This Encounter   Procedures    ALT(SGPT)     Standing Status:   Future     Standing Expiration Date:   6/14/2025    AST (SGOT)     Standing Status:   Future     Standing Expiration Date:   6/14/2025    Basic Metabolic Panel (8)     Standing Status:   Future     Standing Expiration Date:   6/14/2025    CBC, Platelet; No Differential     Standing Status:   Future     Standing Expiration Date:   6/14/2025    Lipid Panel     Standing Status:   Future     Standing Expiration Date:   6/14/2025    TSH W Reflex To Free T4     Standing Status:   Future     Standing Expiration Date:   6/14/2025       NEURO - INTERNAL    Meds & Refills for this Visit:  Requested Prescriptions      No prescriptions requested or ordered in this encounter             Authorized by Prince Schaffer M.D.

## 2024-06-17 ENCOUNTER — LABORATORY ENCOUNTER (OUTPATIENT)
Dept: LAB | Age: 49
End: 2024-06-17
Attending: FAMILY MEDICINE

## 2024-06-17 DIAGNOSIS — Z00.00 WELL ADULT EXAM: ICD-10-CM

## 2024-06-17 LAB
ALT SERPL-CCNC: 18 U/L
ANION GAP SERPL CALC-SCNC: 5 MMOL/L (ref 0–18)
AST SERPL-CCNC: 13 U/L (ref 15–37)
BUN BLD-MCNC: 14 MG/DL (ref 9–23)
CALCIUM BLD-MCNC: 8.5 MG/DL (ref 8.5–10.1)
CHLORIDE SERPL-SCNC: 112 MMOL/L (ref 98–112)
CHOLEST SERPL-MCNC: 187 MG/DL (ref ?–200)
CO2 SERPL-SCNC: 24 MMOL/L (ref 21–32)
CREAT BLD-MCNC: 0.8 MG/DL
EGFRCR SERPLBLD CKD-EPI 2021: 91 ML/MIN/1.73M2 (ref 60–?)
ERYTHROCYTE [DISTWIDTH] IN BLOOD BY AUTOMATED COUNT: 13.5 %
FASTING PATIENT LIPID ANSWER: YES
FASTING STATUS PATIENT QL REPORTED: YES
GLUCOSE BLD-MCNC: 82 MG/DL (ref 70–99)
HCT VFR BLD AUTO: 37.3 %
HDLC SERPL-MCNC: 59 MG/DL (ref 40–59)
HGB BLD-MCNC: 12 G/DL
LDLC SERPL CALC-MCNC: 110 MG/DL (ref ?–100)
MCH RBC QN AUTO: 27.5 PG (ref 26–34)
MCHC RBC AUTO-ENTMCNC: 32.2 G/DL (ref 31–37)
MCV RBC AUTO: 85.4 FL
NONHDLC SERPL-MCNC: 128 MG/DL (ref ?–130)
OSMOLALITY SERPL CALC.SUM OF ELEC: 292 MOSM/KG (ref 275–295)
PLATELET # BLD AUTO: 277 10(3)UL (ref 150–450)
POTASSIUM SERPL-SCNC: 3.7 MMOL/L (ref 3.5–5.1)
RBC # BLD AUTO: 4.37 X10(6)UL
SODIUM SERPL-SCNC: 141 MMOL/L (ref 136–145)
TRIGL SERPL-MCNC: 98 MG/DL (ref 30–149)
TSI SER-ACNC: 1.78 MIU/ML (ref 0.36–3.74)
VLDLC SERPL CALC-MCNC: 17 MG/DL (ref 0–30)
WBC # BLD AUTO: 9.4 X10(3) UL (ref 4–11)

## 2024-06-17 PROCEDURE — 80061 LIPID PANEL: CPT | Performed by: FAMILY MEDICINE

## 2024-06-17 PROCEDURE — 85027 COMPLETE CBC AUTOMATED: CPT | Performed by: FAMILY MEDICINE

## 2024-06-17 PROCEDURE — 84460 ALANINE AMINO (ALT) (SGPT): CPT | Performed by: FAMILY MEDICINE

## 2024-06-17 PROCEDURE — 84450 TRANSFERASE (AST) (SGOT): CPT | Performed by: FAMILY MEDICINE

## 2024-06-17 PROCEDURE — 84443 ASSAY THYROID STIM HORMONE: CPT | Performed by: FAMILY MEDICINE

## 2024-06-17 PROCEDURE — 80048 BASIC METABOLIC PNL TOTAL CA: CPT | Performed by: FAMILY MEDICINE

## 2024-06-24 DIAGNOSIS — F50.81 BINGE EATING DISORDER: ICD-10-CM

## 2024-06-25 RX ORDER — LISDEXAMFETAMINE DIMESYLATE 60 MG/1
60 CAPSULE ORAL EVERY MORNING
Qty: 30 CAPSULE | Refills: 0 | Status: SHIPPED | OUTPATIENT
Start: 2024-06-25

## 2024-07-25 DIAGNOSIS — F50.81 BINGE EATING DISORDER: ICD-10-CM

## 2024-07-25 RX ORDER — LISDEXAMFETAMINE DIMESYLATE 60 MG/1
60 CAPSULE ORAL EVERY MORNING
Qty: 30 CAPSULE | Refills: 0 | Status: SHIPPED | OUTPATIENT
Start: 2024-07-25 | End: 2024-07-26

## 2024-07-26 ENCOUNTER — PATIENT MESSAGE (OUTPATIENT)
Dept: FAMILY MEDICINE CLINIC | Facility: CLINIC | Age: 49
End: 2024-07-26

## 2024-07-26 DIAGNOSIS — F50.81 BINGE EATING DISORDER: ICD-10-CM

## 2024-07-26 RX ORDER — LISDEXAMFETAMINE DIMESYLATE 60 MG/1
60 CAPSULE ORAL EVERY MORNING
Qty: 30 CAPSULE | Refills: 0 | Status: SHIPPED | OUTPATIENT
Start: 2024-07-26

## 2024-07-26 NOTE — TELEPHONE ENCOUNTER
From: Babita Joy  To: Prince Schaffer  Sent: 7/26/2024 1:29 PM CDT  Subject: Prescription is out of stock.     The online pharmacy is out of stock. Can we try [facility] in [place]?   Thanks!  Divya    Hx of clostridium difficile last admission    - c/w vancomycin 125 po qD Hx of clostridium difficile last admission. Patient will require vancomycin 125mg po qD for 5-7 days for c. diff prophylaxis after completion of abx    - c/w vancomycin 125 po qD

## 2024-08-02 ENCOUNTER — TELEPHONE (OUTPATIENT)
Dept: FAMILY MEDICINE CLINIC | Facility: CLINIC | Age: 49
End: 2024-08-02

## 2024-08-02 NOTE — TELEPHONE ENCOUNTER
Patient states she has a bruise below her knee on the medial anterior portion of her leg. Patient states that it is red and swollen, she expresses concerns of it being a possible blood clot. States it is painful when she moves her leg in specific ways. Endorsed to RN.

## 2024-08-30 ENCOUNTER — HOSPITAL ENCOUNTER (OUTPATIENT)
Dept: MAMMOGRAPHY | Age: 49
Discharge: HOME | End: 2024-08-30
Attending: FAMILY MEDICINE
Payer: COMMERCIAL

## 2024-08-30 DIAGNOSIS — R92.8 ABNORMAL MAMMOGRAM OF BOTH BREASTS: ICD-10-CM

## 2024-08-30 PROCEDURE — 77066 DX MAMMO INCL CAD BI: CPT | Performed by: FAMILY MEDICINE

## 2024-08-30 PROCEDURE — 77062 BREAST TOMOSYNTHESIS BI: CPT | Performed by: FAMILY MEDICINE

## 2024-09-05 DIAGNOSIS — F50.81 BINGE EATING DISORDER: ICD-10-CM

## 2024-09-06 RX ORDER — LISDEXAMFETAMINE DIMESYLATE 60 MG/1
60 CAPSULE ORAL EVERY MORNING
Qty: 30 CAPSULE | Refills: 0 | Status: SHIPPED | OUTPATIENT
Start: 2024-09-06

## 2024-09-13 ENCOUNTER — OFFICE VISIT (OUTPATIENT)
Dept: FAMILY MEDICINE CLINIC | Facility: CLINIC | Age: 49
End: 2024-09-13
Payer: COMMERCIAL

## 2024-09-13 VITALS
SYSTOLIC BLOOD PRESSURE: 122 MMHG | WEIGHT: 214 LBS | OXYGEN SATURATION: 98 % | HEART RATE: 103 BPM | TEMPERATURE: 97 F | DIASTOLIC BLOOD PRESSURE: 82 MMHG | BODY MASS INDEX: 30 KG/M2

## 2024-09-13 DIAGNOSIS — M25.561 RIGHT KNEE PAIN, UNSPECIFIED CHRONICITY: Primary | ICD-10-CM

## 2024-09-13 PROCEDURE — 3074F SYST BP LT 130 MM HG: CPT | Performed by: FAMILY MEDICINE

## 2024-09-13 PROCEDURE — 3079F DIAST BP 80-89 MM HG: CPT | Performed by: FAMILY MEDICINE

## 2024-09-13 PROCEDURE — 99214 OFFICE O/P EST MOD 30 MIN: CPT | Performed by: FAMILY MEDICINE

## 2024-09-13 RX ORDER — MELOXICAM 15 MG/1
15 TABLET ORAL DAILY
Qty: 30 TABLET | Refills: 0 | Status: SHIPPED | OUTPATIENT
Start: 2024-09-13

## 2024-09-13 NOTE — PROGRESS NOTES
Babita oJy is a 48 year old female.    CC:    Chief Complaint   Patient presents with    Knee Pain       HPI:  Right medial knee pain for about 4 months. 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 No actual trauma. Ice and a knee brace of some help. NO other joints affected similarly. No rash. Sometime the area of pain can seem swollen and a hint red.   Allergies:  Allergies   Allergen Reactions    Penicillins RASH    Povidone Iodine RASH      Current Meds:  Current Outpatient Medications   Medication Sig Dispense Refill    Lisdexamfetamine Dimesylate (VYVANSE) 60 MG Oral Cap Take 1 capsule (60 mg total) by mouth every morning. 30 capsule 0        History:  Past Medical History:    Anemia    Due to heavy/irregular menses    Attention deficit hyperactivity disorder (ADHD)    Binge eating disorder    Hx of motion sickness    PONV (postoperative nausea and vomiting)      Past Surgical History:   Procedure Laterality Date    Anesth, section      x 2      Family History   Problem Relation Age of Onset    Hypertension Mother     Diabetes Father     Bipolar Disorder Father     Heart Disease Father     Breast Cancer Maternal Aunt 52      Family Status   Relation Status    Mo Alive    Fa     Mat Aunt (Not Specified)      Social History     Socioeconomic History    Marital status:    Tobacco Use    Smoking status: Never    Smokeless tobacco: Never   Vaping Use    Vaping status: Never Used   Substance and Sexual Activity    Alcohol use: Yes     Comment: social wine intake    Drug use: Yes     Types: Cannabis     Comment: occasionally     Social Determinants of Health     Food Insecurity: No Food Insecurity (2024)    Received from [facility]    Food Insecurity     Currently or in the past 3 months, have you worried your food would run out before you had money to buy more?: No   Transportation Needs: No Transportation Needs  (5/16/2024)    Received from [facility]    Transportation Needs     Medical Transportation Needs?: No    Received from [facility], [facility]    Social Connections    Received from [facility], [facility]    Housing Stability        ROS:  General: energy level stable      Vitals: /82   Pulse 103   Temp 97 °F (36.1 °C) (Temporal)   Wt 214 lb (97.1 kg)   LMP 09/09/2024 (Exact Date)   SpO2 98%   BMI 29.85 kg/m²    Reviewed by RICK Schaffer M.D.    Physical Exam:  GEN: well developed, well nourished, in no apparent distress  EYE: B conjunctiva and lids normal  HENT: ---  NECK: No lymphadenopathy, thyromegaly or masses  CAR: S1, S2 normal, RRR; no S3, no S4; no click; murmur negative  PULM: clear to auscultation B, no accessory muscle use  GI: not examined  PSYCH: alert and oriented x 3; affect appropriate  SKIN: no rashes, no suspicious lesions at R knee  EXTREMITIES: No clubbing, cyanosis or edema  GENITAL: not examined  LYMPH: no supraclavicular nodes  NEURO: Awake and alert. Normal speech and articulation. No facial droop or asymmetry. Moving all extremities equally.  Right knee exam:  Anatomy appears normal upon inspection., No effusion present., No palpable Baker's cyst., Anterior drawer test, negative., Posterior drawer test, negative., Lateral collateral ligament intact., Medial collateral ligament intact., No lateral joint line tenderness., No medial joint line tenderness., Danielle's tests negative. Full ROM. + tenderness with palpation of the pes anserine bursal region    ASSESSMENT AND PLAN    1. Right knee pain, unspecified chronicity  Appears to have a pes anserine bursitis/tendonitis  She will use Mobic daily for 2 weeks, wear a knee brace and ice.   She will try HEP as can be found on YouTube as she would like to avoid PT as she has time constraints    - XR KNEE (1 OR 2 VIEWS), RIGHT (CPT=73560);  Future      No follow-ups on file.    Orders for this visit:    No orders of the defined types were placed in this encounter.      XR KNEE (1 OR 2 VIEWS), RIGHT (CPT=73560)    Meds & Refills for this Visit:  Requested Prescriptions     Signed Prescriptions Disp Refills    Meloxicam (MOBIC) 15 MG Oral Tab 30 tablet 0     Sig: Take 1 tablet (15 mg total) by mouth daily.             Authorized by Prince Schaffer M.D.

## 2024-10-04 DIAGNOSIS — F50.819 BINGE EATING DISORDER: ICD-10-CM

## 2024-10-05 RX ORDER — LISDEXAMFETAMINE DIMESYLATE 60 MG/1
60 CAPSULE ORAL EVERY MORNING
Qty: 30 CAPSULE | Refills: 0 | Status: SHIPPED | OUTPATIENT
Start: 2024-10-05

## 2024-10-10 ENCOUNTER — HOSPITAL ENCOUNTER (OUTPATIENT)
Dept: GENERAL RADIOLOGY | Age: 49
Discharge: HOME | End: 2024-10-10
Attending: FAMILY MEDICINE
Payer: COMMERCIAL

## 2024-10-10 DIAGNOSIS — M25.561 RIGHT KNEE PAIN, UNSPECIFIED CHRONICITY: ICD-10-CM

## 2024-10-10 PROCEDURE — 73560 X-RAY EXAM OF KNEE 1 OR 2: CPT | Performed by: FAMILY MEDICINE

## 2024-10-21 DIAGNOSIS — Z12.11 SCREENING FOR COLON CANCER: Primary | ICD-10-CM

## 2024-11-01 DIAGNOSIS — F50.819 BINGE EATING DISORDER: ICD-10-CM

## 2024-11-01 RX ORDER — LISDEXAMFETAMINE DIMESYLATE 60 MG/1
60 CAPSULE ORAL EVERY MORNING
Qty: 30 CAPSULE | Refills: 0 | Status: SHIPPED | OUTPATIENT
Start: 2024-11-01

## 2024-11-26 ENCOUNTER — PATIENT MESSAGE (OUTPATIENT)
Dept: FAMILY MEDICINE CLINIC | Facility: CLINIC | Age: 49
End: 2024-11-26

## 2024-11-29 ENCOUNTER — OFFICE VISIT (OUTPATIENT)
Dept: FAMILY MEDICINE CLINIC | Facility: CLINIC | Age: 49
End: 2024-11-29
Payer: COMMERCIAL

## 2024-11-29 VITALS
DIASTOLIC BLOOD PRESSURE: 84 MMHG | HEIGHT: 71 IN | RESPIRATION RATE: 18 BRPM | TEMPERATURE: 97 F | BODY MASS INDEX: 28.7 KG/M2 | HEART RATE: 102 BPM | OXYGEN SATURATION: 98 % | WEIGHT: 205 LBS | SYSTOLIC BLOOD PRESSURE: 134 MMHG

## 2024-11-29 DIAGNOSIS — J01.00 ACUTE NON-RECURRENT MAXILLARY SINUSITIS: Primary | ICD-10-CM

## 2024-11-29 PROCEDURE — 99213 OFFICE O/P EST LOW 20 MIN: CPT | Performed by: PHYSICIAN ASSISTANT

## 2024-11-29 PROCEDURE — 3008F BODY MASS INDEX DOCD: CPT | Performed by: PHYSICIAN ASSISTANT

## 2024-11-29 PROCEDURE — 3079F DIAST BP 80-89 MM HG: CPT | Performed by: PHYSICIAN ASSISTANT

## 2024-11-29 PROCEDURE — 3075F SYST BP GE 130 - 139MM HG: CPT | Performed by: PHYSICIAN ASSISTANT

## 2024-11-29 RX ORDER — FLUTICASONE PROPIONATE 50 MCG
2 SPRAY, SUSPENSION (ML) NASAL DAILY
Qty: 1 EACH | Refills: 0 | Status: SHIPPED | OUTPATIENT
Start: 2024-11-29 | End: 2025-11-24

## 2024-11-29 RX ORDER — DOXYCYCLINE 100 MG/1
100 CAPSULE ORAL 2 TIMES DAILY
Qty: 14 CAPSULE | Refills: 0 | Status: SHIPPED | OUTPATIENT
Start: 2024-11-29 | End: 2024-12-06

## 2024-11-29 NOTE — PATIENT INSTRUCTIONS
Doxycycline 100 mg twice daily for 7 days. /   Flonase: 2 sprays in each nostril daily, or 1 spray in each nostril twice daily.  If you develop a nosebleed, stop medication and restart at half the dose (1 spray in each nostril daily) after you have not had a nosebleed for 2 days.  If nosebleed recurs, then stop medication completely.   Encourage fluids, humidifier/vaporizor at bedside, elevate head of bed (sleep with extra pillow), vapor rub to chest, steam therapy if no fever, warm compresses for sinus pressure if no fever, salt water gargles for sore throat, lozenges for sore throat, may try over the counter saline nasal spray or irrigation kit (use distilled water with irrigation kit) for sinus pressure/congestion, get plenty of rest.        Follow up with your primary care provider if your symptoms fail to improve and resolve as anticipated    Go to the Immediate Care or Emergency Department in event of new or worsening symptoms at any time

## 2024-11-29 NOTE — PROGRESS NOTES
CHIEF COMPLAINT:     Chief Complaint   Patient presents with    Cold     I have had symptoms since Monday last week and I feel worse this morning than I have since I came down with symptoms. - Entered by patient  No fevers        HPI:   Babita Joy is a 49 year old female who presents for upper respiratory symptoms for  11 days. Patient reports congestion, brown colored nasal discharge, sinus pain, itchy ears , thick post nasal drainage. Symptoms have been worsening since onset.  Treating symptoms with OTC cough/cold meds past few days.   Denies fever, no CP, no SOB/TIM, no n/v/d, no lh/dizziness. .      Current Outpatient Medications   Medication Sig Dispense Refill    doxycycline 100 MG Oral Cap Take 1 capsule (100 mg total) by mouth 2 (two) times daily for 7 days. 14 capsule 0    fluticasone propionate 50 MCG/ACT Nasal Suspension 2 sprays by Each Nare route daily. 1 each 0    Lisdexamfetamine Dimesylate (VYVANSE) 60 MG Oral Cap Take 1 capsule (60 mg total) by mouth every morning. 30 capsule 0    Meloxicam (MOBIC) 15 MG Oral Tab Take 1 tablet (15 mg total) by mouth daily. 30 tablet 0      Past Medical History:    Anemia    Due to heavy/irregular menses    Attention deficit hyperactivity disorder (ADHD)    Binge eating disorder    Hx of motion sickness    PONV (postoperative nausea and vomiting)      Past Surgical History:   Procedure Laterality Date    Anesth, section      x 2         Social History     Socioeconomic History    Marital status:    Tobacco Use    Smoking status: Never    Smokeless tobacco: Never   Vaping Use    Vaping status: Never Used   Substance and Sexual Activity    Alcohol use: Yes     Comment: social wine intake    Drug use: Yes     Types: Cannabis     Comment: occasionally     Social Drivers of Health     Food Insecurity: No Food Insecurity (2024)    Received from [facility]    Food Insecurity     Currently or in the past 3 months, have  you worried your food would run out before you had money to buy more?: No   Transportation Needs: No Transportation Needs (5/16/2024)    Received from [facility]    Transportation Needs     Medical Transportation Needs?: No    Received from [facility], [facility]    Social Connections    Received from [facility], [facility]    Housing Stability         REVIEW OF SYSTEMS:   GENERAL: normal appetite  SKIN: no rashes or abnormal skin lesions  HEENT: See HPI  LUNGS: See HPI  CARDIOVASCULAR: denies chest pain or palpitations   GI: denies N/V/C or abdominal pain      EXAM:   /84   Pulse 102   Temp 97 °F (36.1 °C)   Resp 18   Ht 5' 11\" (1.803 m)   Wt 205 lb (93 kg)   LMP 11/15/2024 (Approximate)   SpO2 98%   BMI 28.59 kg/m²   GENERAL: well developed, well nourished,in no apparent distress  SKIN: no rashes,no suspicious lesions  HEAD: atraumatic, normocephalic.  (+) tenderness on palpation of maxillary sinuses  EYES: conjunctiva clear, EOM intact  EARS: TM's clear bilaterally  NOSE: Nostrils patent, clear/white nasal discharge, nasal mucosa erythematous/edematous   THROAT: Oral mucosa pink, moist. Posterior pharynx is non erythematous. without exudates, no hypertrophy, (+) thick post nasal drainage.   NECK: Supple, non-tender  LUNGS: clear to auscultation bilaterally, no wheezes or rhonchi. Breathing is non labored.  CARDIO: RRR without murmur  EXTREMITIES: no cyanosis, clubbing or edema  LYMPH:  no lymphadenopathy.        ASSESSMENT AND PLAN:   Babita Joy is a 49 year old female who presents with upper respiratory symptoms that are consistent with    ASSESSMENT:   Encounter Diagnosis   Name Primary?    Acute non-recurrent maxillary sinusitis Yes       PLAN: Meds as below.  Comfort care as described in Patient Instructions    Meds & Refills for this Visit:  Requested Prescriptions     Signed  Prescriptions Disp Refills    doxycycline 100 MG Oral Cap 14 capsule 0     Sig: Take 1 capsule (100 mg total) by mouth 2 (two) times daily for 7 days.    fluticasone propionate 50 MCG/ACT Nasal Suspension 1 each 0     Si sprays by Each Nare route daily.     Risks, benefits, and side effects of medication explained and discussed.    The patient indicates understanding of these issues and agrees to the plan.  The patient is asked to f/u with PCP if sx's persist or worsen.  Patient Instructions    Doxycycline 100 mg twice daily for 7 days. /   Flonase: 2 sprays in each nostril daily, or 1 spray in each nostril twice daily.  If you develop a nosebleed, stop medication and restart at half the dose (1 spray in each nostril daily) after you have not had a nosebleed for 2 days.  If nosebleed recurs, then stop medication completely.   Encourage fluids, humidifier/vaporizor at bedside, elevate head of bed (sleep with extra pillow), vapor rub to chest, steam therapy if no fever, warm compresses for sinus pressure if no fever, salt water gargles for sore throat, lozenges for sore throat, may try over the counter saline nasal spray or irrigation kit (use distilled water with irrigation kit) for sinus pressure/congestion, get plenty of rest.        Follow up with your primary care provider if your symptoms fail to improve and resolve as anticipated    Go to the Immediate Care or Emergency Department in event of new or worsening symptoms at any time       Tiffanie Greenwood PA-C

## 2024-12-01 DIAGNOSIS — F50.819 BINGE EATING DISORDER: ICD-10-CM

## 2024-12-02 RX ORDER — LISDEXAMFETAMINE DIMESYLATE 60 MG/1
60 CAPSULE ORAL EVERY MORNING
Qty: 30 CAPSULE | Refills: 0 | Status: SHIPPED | OUTPATIENT
Start: 2024-12-02

## 2024-12-02 NOTE — TELEPHONE ENCOUNTER
Please let patient or caregiver know or leave message that refill request for the medication/s listed below has/have come to our office. The refills have been sent.    It appears she still needs to complete the FOBT card for colon cancer screening. Perhaps she could accomplish this in the next week or so.    Thanks     Requested Prescriptions     Signed Prescriptions Disp Refills    Lisdexamfetamine Dimesylate (VYVANSE) 60 MG Oral Cap 30 capsule 0     Sig: Take 1 capsule (60 mg total) by mouth every morning.     Authorizing Provider: LIZETTE FOWLER

## 2024-12-10 ENCOUNTER — TELEPHONE (OUTPATIENT)
Dept: FAMILY MEDICINE CLINIC | Facility: CLINIC | Age: 49
End: 2024-12-10

## 2024-12-10 NOTE — TELEPHONE ENCOUNTER
Received paperwork from [employer] for prior authorization Vyvanse 60 mg       Requested prior authorization, answered questions, awaiting on insurance

## 2024-12-10 NOTE — TELEPHONE ENCOUNTER
Approved   12/10/2024  1:00 PM  Appeal supported: No   Note from payer: The case has been Approved from   to   Payer: [employer] [facility] Case ID: [id number]    [phone]    [phone]

## 2025-01-06 DIAGNOSIS — F50.819 BINGE EATING DISORDER: ICD-10-CM

## 2025-01-06 RX ORDER — LISDEXAMFETAMINE DIMESYLATE 60 MG/1
60 CAPSULE ORAL EVERY MORNING
Qty: 30 CAPSULE | Refills: 0 | Status: SHIPPED | OUTPATIENT
Start: 2025-01-06 | End: 2025-02-12

## 2025-01-06 NOTE — TELEPHONE ENCOUNTER
Please let patient or caregiver know or leave message that refill request for the medication/s listed below has/have come to our office. The refills have been sent.    It appears she has not done the FIT test as a screening measure for colon cancer screening. It would be appreciated if she get this done in the next month or so.    Thanks     Requested Prescriptions     Signed Prescriptions Disp Refills    Lisdexamfetamine Dimesylate (VYVANSE) 60 MG Oral Cap 30 capsule 0     Sig: Take 1 capsule (60 mg total) by mouth every morning.     Authorizing Provider: LIZETTE FOWLER

## 2025-02-10 ENCOUNTER — PATIENT MESSAGE (OUTPATIENT)
Dept: FAMILY MEDICINE CLINIC | Facility: CLINIC | Age: 50
End: 2025-02-10

## 2025-02-12 ENCOUNTER — LAB ENCOUNTER (OUTPATIENT)
Dept: LAB | Age: 50
End: 2025-02-12
Attending: FAMILY MEDICINE
Payer: COMMERCIAL

## 2025-02-12 ENCOUNTER — TELEMEDICINE (OUTPATIENT)
Dept: FAMILY MEDICINE CLINIC | Facility: CLINIC | Age: 50
End: 2025-02-12
Payer: COMMERCIAL

## 2025-02-12 DIAGNOSIS — F50.819 BINGE EATING DISORDER: ICD-10-CM

## 2025-02-12 PROCEDURE — 98006 SYNCH AUDIO-VIDEO EST MOD 30: CPT | Performed by: FAMILY MEDICINE

## 2025-02-12 RX ORDER — LISDEXAMFETAMINE DIMESYLATE 60 MG/1
60 CAPSULE ORAL EVERY MORNING
Qty: 30 CAPSULE | Refills: 0 | Status: SHIPPED | OUTPATIENT
Start: 2025-02-12

## 2025-02-12 NOTE — PROGRESS NOTES
My Chart/ Video/Telephone Visit Check-In    Babita Joy and/or caregiver verbally consents a video Check-In service on 25.  Patient understands and accepts financial responsibility for any deductible, co-insurance and/or co-pays associated with this service.    Duration of the service including reviewing the chart, engaging with the patient and giving medical guidance: 11 minutes    Babita Joy is a 49 year old female.    CC:  Med check    HPI:  The patient is here for follow up medication use for binge eating disorder. She has been Vyvanse for some time now. It continues to work. She notes that symptoms rear up on days she does not take the med.     Side effects of medication:  Sleep disturbance: no  Chest pains/palpitaions : no  GI upset/loss of appetite: no   Mood: stable, actually feels better when on the med     Allergies as of 2025 - Review Complete 2024   Allergen Reaction Noted    Penicillins RASH 2017    Povidone iodine RASH 2017        Current Meds:  Current Outpatient Medications   Medication Sig Dispense Refill    Lisdexamfetamine Dimesylate (VYVANSE) 60 MG Oral Cap Take 1 capsule (60 mg total) by mouth every morning. 30 capsule 0    fluticasone propionate 50 MCG/ACT Nasal Suspension 2 sprays by Each Nare route daily. 1 each 0        History:  Past Medical History:    Anemia    Due to heavy/irregular menses    Attention deficit hyperactivity disorder (ADHD)    Binge eating disorder    Hx of motion sickness    PONV (postoperative nausea and vomiting)      Past Surgical History:   Procedure Laterality Date    Anesth, section      x 2      Family History   Problem Relation Age of Onset    Hypertension Mother     Diabetes Father     Bipolar Disorder Father     Heart Disease Father     Breast Cancer Maternal Aunt 52      Family Status   Relation Status    Mo Alive    Fa     Mat Aunt (Not Specified)      Social History     Socioeconomic  History    Marital status:    Tobacco Use    Smoking status: Never    Smokeless tobacco: Never   Vaping Use    Vaping status: Never Used   Substance and Sexual Activity    Alcohol use: Yes     Comment: social wine intake    Drug use: Yes     Types: Cannabis     Comment: occasionally     Social Drivers of Health     Food Insecurity: No Food Insecurity (5/16/2024)    Received from [facility]    Food Insecurity     Currently or in the past 3 months, have you worried your food would run out before you had money to buy more?: No     In the past 12 months, have you run out of food or been unable to get more?: Unrecognized value   Transportation Needs: No Transportation Needs (5/16/2024)    Received from [facility]    Transportation Needs     Currently or in the past 3 months, has lack of transportation kept you from medical appointments, getting food or medicine, or providing care to a family member?: Unrecognized value     Medical Transportation Needs?: No    Received from [facility], [facility]    Housing Stability        ROS:  General: energy level stable  Neuro: Denies tremor     Physical Exam:  General: No apparent distress when conversing with me  Psych: Alert and oriented x 3, speech was not pressured  Resp: No respiratory distress noted when conversing with me, able to speak in full sentences.     ASSESSMENT AND PLAN    1. Binge eating disorder  Stable   Continue present medications   F/u in 6 months   - Lisdexamfetamine Dimesylate (VYVANSE) 60 MG Oral Cap; Take 1 capsule (60 mg total) by mouth every morning.  Dispense: 30 capsule; Refill: 0      No follow-ups on file.    Orders for this visit:    No orders of the defined types were placed in this encounter.      None    Meds & Refills for this Visit:  Requested Prescriptions     Signed Prescriptions Disp Refills    Lisdexamfetamine Dimesylate (VYVANSE) 60 MG Oral Cap 30  capsule 0     Sig: Take 1 capsule (60 mg total) by mouth every morning.             Authorized by Prince Schaffer M.D.          Please note that the following visit was completed using two-way, real-time interactive audio and/or video communication.  This has been done in good kaykay to provide continuity of care in the best interest of the provider-patient relationship, due to the ongoing public health crisis/national emergency and because of restrictions of visitation.  There are limitations of this visit as no physical exam could be performed.  Every conscious effort was taken to allow for sufficient and adequate time.  This billing was spent on reviewing labs, medications, radiology tests and decision making.  Appropriate medical decision-making and tests are ordered as detailed in the plan of care above.”

## 2025-03-10 DIAGNOSIS — F50.819 BINGE EATING DISORDER: ICD-10-CM

## 2025-03-10 RX ORDER — LISDEXAMFETAMINE DIMESYLATE 60 MG/1
60 CAPSULE ORAL EVERY MORNING
Qty: 30 CAPSULE | Refills: 0 | Status: SHIPPED | OUTPATIENT
Start: 2025-03-10

## 2025-04-10 DIAGNOSIS — F50.819 BINGE EATING DISORDER: ICD-10-CM

## 2025-04-10 RX ORDER — LISDEXAMFETAMINE DIMESYLATE 60 MG/1
60 CAPSULE ORAL EVERY MORNING
Qty: 30 CAPSULE | Refills: 0 | Status: SHIPPED | OUTPATIENT
Start: 2025-04-10

## 2025-05-09 DIAGNOSIS — F50.819 BINGE EATING DISORDER: ICD-10-CM

## 2025-05-09 RX ORDER — LISDEXAMFETAMINE DIMESYLATE 60 MG/1
60 CAPSULE ORAL EVERY MORNING
Qty: 30 CAPSULE | Refills: 0 | Status: SHIPPED | OUTPATIENT
Start: 2025-05-09

## 2025-06-06 ENCOUNTER — PATIENT MESSAGE (OUTPATIENT)
Dept: FAMILY MEDICINE CLINIC | Facility: CLINIC | Age: 50
End: 2025-06-06

## 2025-06-11 ENCOUNTER — OFFICE VISIT (OUTPATIENT)
Dept: FAMILY MEDICINE CLINIC | Facility: CLINIC | Age: 50
End: 2025-06-11
Payer: COMMERCIAL

## 2025-06-11 ENCOUNTER — PATIENT MESSAGE (OUTPATIENT)
Dept: FAMILY MEDICINE CLINIC | Facility: CLINIC | Age: 50
End: 2025-06-11

## 2025-06-11 VITALS
WEIGHT: 214.81 LBS | HEART RATE: 111 BPM | SYSTOLIC BLOOD PRESSURE: 116 MMHG | OXYGEN SATURATION: 98 % | DIASTOLIC BLOOD PRESSURE: 70 MMHG | BODY MASS INDEX: 30 KG/M2 | TEMPERATURE: 99 F

## 2025-06-11 DIAGNOSIS — F50.819 BINGE EATING DISORDER: ICD-10-CM

## 2025-06-11 DIAGNOSIS — M54.16 LEFT LUMBAR RADICULOPATHY: Primary | ICD-10-CM

## 2025-06-11 DIAGNOSIS — R29.2: ICD-10-CM

## 2025-06-11 DIAGNOSIS — R29.898 LEFT LEG WEAKNESS: ICD-10-CM

## 2025-06-11 PROCEDURE — 3078F DIAST BP <80 MM HG: CPT | Performed by: FAMILY MEDICINE

## 2025-06-11 PROCEDURE — 3074F SYST BP LT 130 MM HG: CPT | Performed by: FAMILY MEDICINE

## 2025-06-11 PROCEDURE — 99214 OFFICE O/P EST MOD 30 MIN: CPT | Performed by: FAMILY MEDICINE

## 2025-06-11 RX ORDER — LISDEXAMFETAMINE DIMESYLATE 60 MG/1
60 CAPSULE ORAL EVERY MORNING
Qty: 30 CAPSULE | Refills: 0 | Status: SHIPPED | OUTPATIENT
Start: 2025-06-11

## 2025-06-11 RX ORDER — GABAPENTIN 300 MG/1
CAPSULE ORAL
Qty: 90 CAPSULE | Refills: 0 | Status: SHIPPED | OUTPATIENT
Start: 2025-06-11

## 2025-06-11 NOTE — PROGRESS NOTES
Babita Joy is a 49 year old female.    CC:    Chief Complaint   Patient presents with    Sciatica       HPI:  Chief Complaint: Low Back Pain  Duration:  low grade pain for about 1 year. 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 Coronal and sagittal reformats were also provided for review. Automatic exposure control was used for this study.    COMPARISON: None Available.    FINDINGS:  No acute fracture or traumatic spondylolisthesis.  Vertebral body height is maintained.  Mild endplate degenerative changes with marginal sclerosis and endplate osteophytes.  Disc space height is maintained.  Normal alignment of bilateral sacroiliac joints.    Visualized soft tissue structures are within normal limits.  Procedure Note    Puja Butler MD - 06/03/2025  Formatting of this note might be different from the original.  EXAM: CT LUMBAR SPINE WITHOUT IV CONTRAST    Allergies as of 06/11/2025 - Review Complete 02/12/2025   Allergen Reaction Noted    Penicillins RASH 01/31/2017    Povidone iodine RASH 01/31/2017        Current Meds:  Current Outpatient Medications   Medication Sig Dispense Refill    gabapentin 300 MG Oral Cap 1 tab nightly x 1 night, then 1 tab twice per day x 1 day, then 1 tab TID 90 capsule 0    Lisdexamfetamine Dimesylate (VYVANSE) 60 MG Oral Cap Take 1 capsule (60 mg total) by mouth every morning. 30 capsule 0        History:  Past Medical History:    Anemia    Due to heavy/irregular menses    Attention deficit  hyperactivity disorder (ADHD)    Binge eating disorder    Hx of motion sickness    PONV (postoperative nausea and vomiting)      Past Surgical History:   Procedure Laterality Date    Anesth, section      x 2      Family History   Problem Relation Age of Onset    Hypertension Mother     Diabetes Father     Bipolar Disorder Father     Heart Disease Father     Breast Cancer Maternal Aunt 52      Family Status   Relation Status    Mo Alive    Fa     Mat Aunt (Not Specified)      Social History     Socioeconomic History    Marital status:    Tobacco Use    Smoking status: Never    Smokeless tobacco: Never   Vaping Use    Vaping status: Never Used   Substance and Sexual Activity    Alcohol use: Yes     Comment: social wine intake    Drug use: Yes     Types: Cannabis     Comment: occasionally     Social Drivers of Health     Food Insecurity: No Food Insecurity (2024)    Received from [facility]    Food Insecurity     Currently or in the past 3 months, have you worried your food would run out before you had money to buy more?: No     In the past 12 months, have you run out of food or been unable to get more?: Unrecognized value   Transportation Needs: No Transportation Needs (2024)    Received from [facility]    Transportation Needs     Currently or in the past 3 months, has lack of transportation kept you from medical appointments, getting food or medicine, or providing care to a family member?: No     Medical Transportation Needs?: No    Received from [facility]    Housing Stability        ROS:  General: energy level stable, no fever  Skin: no rash    Vitals: /70   Pulse 111   Temp 99.2 °F (37.3 °C) (Temporal)   Wt 214 lb 12.8 oz (97.4 kg)   LMP 05/15/2025 (Approximate)   SpO2 98%   BMI 29.96 kg/m²    Reviewed by RICK Schaffer M.D.    Physical Exam:  GEN: well developed, well nourished, uncomfortable appearing  EYE: B  conjunctiva and lids normal  HENT: ---  NECK: No lymphadenopathy, thyromegaly or masses  CAR: S1, S2 normal, RRR; no S3, no S4; no click; murmur negative  PULM: clear to auscultation B, no accessory muscle use  GI: not examined  PSYCH: alert and oriented x 3; affect appropriate  SKIN:  no rash on the lower back  EXTREMITIES: No clubbing, cyanosis or edema  GENITAL: not examined  LYMPH: no supraclavicular nodes  NEURO: Awake and alert. Normal speech and articulation. No facial droop or asymmetry. Moving all extremities equally. + L SLR, absent L knee DTR, symmetric B Achilles DTRs,   MS: L hip flexion and L knee extension at 4/5. I am unable to produce tenderness with palpation of the lumbar spine, or para lumbar regions.    ASSESSMENT AND PLAN    1. Left lumbar radiculopathy  Take prescribed medications as directed.   Await results   -  [facility] SPINE LUMBAR (CPT=72148); Future  -  [facility] SPINE LUMBAR (CPT=72148)    2. Left leg weakness  As above   - z [facility] SPINE LUMBAR (CPT=72148); Future  - z [facility] SPINE LUMBAR (CPT=72148)    3. Absent left knee reflex  As above   - z [facility] SPINE LUMBAR (CPT=72148); Future  - z [facility] SPINE LUMBAR (CPT=72148)      No follow-ups on file.    Orders for this visit:    No orders of the defined types were placed in this encounter.      MRI SPINE LUMBAR (CPT=72148)    Meds & Refills for this Visit:  Requested Prescriptions     Signed Prescriptions Disp Refills    gabapentin 300 MG Oral Cap 90 capsule 0     Si tab nightly x 1 night, then 1 tab twice per day x 1 day, then 1 tab TID             Authorized by Prince Schaffer M.D.

## 2025-07-08 DIAGNOSIS — F50.819 BINGE EATING DISORDER: ICD-10-CM

## 2025-07-09 RX ORDER — LISDEXAMFETAMINE DIMESYLATE 60 MG/1
60 CAPSULE ORAL EVERY MORNING
Qty: 30 CAPSULE | Refills: 0 | Status: SHIPPED | OUTPATIENT
Start: 2025-07-09

## 2025-07-09 NOTE — TELEPHONE ENCOUNTER
Please review; protocol failed/No Protocol      Recent Fills: 04/10/2025 #30, 05/09/2025 #30, 06/11/2025 #30- Due 07/11/2025    Last Rx Written: 06/11/2025    Last Office Visit: 06/11/2025

## 2025-08-07 DIAGNOSIS — F50.819 BINGE EATING DISORDER: ICD-10-CM

## 2025-08-07 DIAGNOSIS — Z12.31 BREAST CANCER SCREENING BY MAMMOGRAM: Primary | ICD-10-CM

## 2025-08-08 RX ORDER — LISDEXAMFETAMINE DIMESYLATE 60 MG/1
60 CAPSULE ORAL EVERY MORNING
Qty: 30 CAPSULE | Refills: 0 | Status: SHIPPED | OUTPATIENT
Start: 2025-08-08

## (undated) DEVICE — STERILE POLYISOPRENE POWDER-FREE SURGICAL GLOVES: Brand: PROTEXIS

## (undated) DEVICE — STANDARD HYPODERMIC NEEDLE,POLYPROPYLENE HUB: Brand: MONOJECT

## (undated) DEVICE — SUT ETHILON 4-0 PS-2 1667H

## (undated) DEVICE — UPPER EXTREMITY CDS-LF: Brand: MEDLINE INDUSTRIES, INC.

## (undated) DEVICE — CURAD OIL EMLUSION GAUZE 3X3

## (undated) DEVICE — BANDAGE COBAN 5YDX1 TAN LTX

## (undated) DEVICE — BNDG COMPR W1INXL5YD FOAM

## (undated) DEVICE — C-ARM: Brand: UNBRANDED

## (undated) DEVICE — STERILE POLYISOPRENE POWDER-FREE SURGICAL GLOVES WITH EMOLLIENT COATING: Brand: PROTEXIS

## (undated) DEVICE — OINTMENT CURAD BACITRACIN .3OZ

## (undated) DEVICE — DISPOSABLE BIPOLAR FORCEPS 4" (10.2CM) JEWELERS, STRAIGHT 0.4MM TIP AND 12 FT. (3.6M) CABLE: Brand: KIRWAN

## (undated) DEVICE — PREMIUM WET SKIN PREP TRAY: Brand: MEDLINE INDUSTRIES, INC.

## (undated) DEVICE — SOL NACL IRRIG 0.9% 1000ML BTL

## (undated) DEVICE — MINI-BLADE®: Brand: BEAVER®